# Patient Record
Sex: MALE | Race: WHITE | NOT HISPANIC OR LATINO | Employment: FULL TIME | URBAN - METROPOLITAN AREA
[De-identification: names, ages, dates, MRNs, and addresses within clinical notes are randomized per-mention and may not be internally consistent; named-entity substitution may affect disease eponyms.]

---

## 2020-05-02 ENCOUNTER — HOSPITAL ENCOUNTER (EMERGENCY)
Facility: CLINIC | Age: 40
Discharge: HOME | End: 2020-05-02
Attending: EMERGENCY MEDICINE | Admitting: EMERGENCY MEDICINE
Payer: COMMERCIAL

## 2020-05-02 VITALS
TEMPERATURE: 98.2 F | HEART RATE: 78 BPM | BODY MASS INDEX: 19.2 KG/M2 | RESPIRATION RATE: 16 BRPM | WEIGHT: 130 LBS | OXYGEN SATURATION: 99 % | DIASTOLIC BLOOD PRESSURE: 68 MMHG | SYSTOLIC BLOOD PRESSURE: 115 MMHG

## 2020-05-02 DIAGNOSIS — J36 PERITONSILLAR ABSCESS: ICD-10-CM

## 2020-05-02 DIAGNOSIS — R07.0 THROAT PAIN: ICD-10-CM

## 2020-05-02 LAB
DEPRECATED S PYO AG THROAT QL EIA: POSITIVE
SPECIMEN SOURCE: ABNORMAL

## 2020-05-02 PROCEDURE — 87077 CULTURE AEROBIC IDENTIFY: CPT | Performed by: EMERGENCY MEDICINE

## 2020-05-02 PROCEDURE — 42700 I&D ABSCESS PERITONSILLAR: CPT | Mod: Z6 | Performed by: EMERGENCY MEDICINE

## 2020-05-02 PROCEDURE — 42700 I&D ABSCESS PERITONSILLAR: CPT | Performed by: EMERGENCY MEDICINE

## 2020-05-02 PROCEDURE — 99283 EMERGENCY DEPT VISIT LOW MDM: CPT | Mod: 25 | Performed by: EMERGENCY MEDICINE

## 2020-05-02 PROCEDURE — 87880 STREP A ASSAY W/OPTIC: CPT | Performed by: EMERGENCY MEDICINE

## 2020-05-02 PROCEDURE — 87070 CULTURE OTHR SPECIMN AEROBIC: CPT | Performed by: EMERGENCY MEDICINE

## 2020-05-02 PROCEDURE — 25000125 ZZHC RX 250: Performed by: EMERGENCY MEDICINE

## 2020-05-02 RX ADMIN — BENZOCAINE 0.5 ML: 220 SPRAY, METERED PERIODONTAL at 05:17

## 2020-05-02 NOTE — ED AVS SNAPSHOT
[facility], [place], Emergency Department  [address]  [facility] [address]  Phone:  [phone]  Fax:  [phone]                                    Damon Forman   MRN: [id number]    Department:  [facility], Emergency Department   Date of Visit:  5/2/2020           After Visit Summary Signature Page    I have received my discharge instructions, and my questions have been answered. I have discussed any challenges I see with this plan with the nurse or doctor.    ..........................................................................................................................................  Patient/Patient Representative Signature      ..........................................................................................................................................  Patient Representative Print Name and Relationship to Patient    ..................................................               ................................................  Date                                   Time    ..........................................................................................................................................  Reviewed by Signature/Title    ...................................................              ..............................................  Date                                               Time          22EPIC Rev 08/18        Cigarettes

## 2020-05-02 NOTE — DISCHARGE INSTRUCTIONS
Take the prescribed antibiotic (Augmentin). Use 600 mg ibuprofen and/or 650 mg acetaminophen every 6 hours as needed for pain.     Please make an appointment to follow up with [facility] (phone: [phone]) in 3-6 days.     Return to the ED if you are having worsening symptoms, or any urgent/life-threatening concerns.

## 2020-05-02 NOTE — ED PROVIDER NOTES
History     Chief Complaint   Patient presents with     Pharyngitis     HPI  Damon Forman is a 39 year old male who presents to the ED with throat pain. Patient notes symptoms past 3-4 days, gradually worsening. He notes more pain and some swelling on the left side. He had a peritonsillar abscess in the past which felt similar but more severe than this. Not coughing, no fever.      I have reviewed the Medications, Allergies, Past Medical and Surgical History, and Social History in the Epic system.    Review of Systems  No recent fevers, no chest pain, no abdominal pain    Physical Exam   BP: 127/89  Pulse: 93  Temp: 98.6  F (37  C)  Resp: 16  Weight: 59 kg (130 lb)  SpO2: 99 %    Physical Exam  General: No acute distress. Appears stated age.   HENT: MMM, uvula deviated to the right with left peritonsillar significant swelling present. Trace purulent tonsillar exudate  Neck: cervical lymphadenopathy present, left > right  Eyes: PERRL, normal sclerae   Cardio: Regular rate, extremities well perfused  Resp: Normal work of breathing, normal respiratory rate  Neuro: alert and fully oriented. CN II-XII grossly intact. Grossly normal strength and sensation in all extremities.   MSK: no deformities.   Integumentary/Skin: no rash, normal color  Psych: normal affect, normal behavior    ED Course      [facility], [place]    PROCEDURE: -Incision/Drainage    Date/Time: 5/2/2020 4:54 AM  Performed by: Saul Duran MD  Authorized by: Saul Duran MD       LOCATION:      Type:  Abscess    Location:  Mouth    Mouth location:  Peritonsillar    ANESTHESIA (see MAR for exact dosages):     Anesthesia method:  Topical application    Topical anesthetic:  Benzocaine gel    PROCEDURE TYPE:     Complexity:  Simple    PROCEDURE DETAILS:     Needle aspiration: yes      Needle size:  18 G    Incision types:  Stab incision    Incision depth:  Subcutaneous    Scalpel blade:  11    Drainage:   Purulent    Drainage amount:  Moderate (2 ml aspirated)    Packing materials:  None  Post-procedure details:     PROCEDURE   Patient Tolerance:  Patient tolerated the procedure well with no immediate complications              Critical Care time:  none     Labs Ordered and Resulted from Time of ED Arrival Up to the Time of Departure from the ED   ABSCESS CULTURE AEROBIC BACTERIAL     No orders to display          Assessments & Plan (with Medical Decision Making)   Patient presenting with throat pain, primarily on the left side. Vitals in the ED unremarkable. Nursing notes reviewed.     Exam consistent with left peritonsillar abscess. Topical anesthetic applied and abscess aspirated and incised as detailed above without complication. Abscess aspirate sent for culture. Rapid strep positive.     The complete clinical picture is most consistent with peritonsillar abscess. After counseling on the diagnosis, work-up, and treatment plan, the patient was discharged. The patient was advised to follow-up with ENT in a few days. Augmentin prescribed. The patient was advised to return to the ED if worsening symptoms, or if there are any urgent/life-threatening concerns.     Final diagnoses:   Peritonsillar abscess   Throat pain     Discharge Medication List as of 5/2/2020  5:39 AM          --  Saul Duran MD   Emergency Medicine   [facility] EMERGENCY DEPARTMENT  5/2/2020     Saul Duran MD  05/02/20 0711

## 2020-05-04 LAB
BACTERIA SPEC CULT: ABNORMAL
BACTERIA SPEC CULT: ABNORMAL
SPECIMEN SOURCE: ABNORMAL

## 2020-10-11 ENCOUNTER — APPOINTMENT (OUTPATIENT)
Dept: GENERAL RADIOLOGY | Facility: CLINIC | Age: 40
End: 2020-10-11
Attending: EMERGENCY MEDICINE
Payer: COMMERCIAL

## 2020-10-11 ENCOUNTER — APPOINTMENT (OUTPATIENT)
Dept: CT IMAGING | Facility: CLINIC | Age: 40
End: 2020-10-11
Attending: EMERGENCY MEDICINE
Payer: COMMERCIAL

## 2020-10-11 ENCOUNTER — HOSPITAL ENCOUNTER (EMERGENCY)
Facility: CLINIC | Age: 40
Discharge: HOME | End: 2020-10-11
Attending: EMERGENCY MEDICINE | Admitting: EMERGENCY MEDICINE
Payer: COMMERCIAL

## 2020-10-11 VITALS
OXYGEN SATURATION: 99 % | SYSTOLIC BLOOD PRESSURE: 118 MMHG | RESPIRATION RATE: 16 BRPM | HEART RATE: 82 BPM | DIASTOLIC BLOOD PRESSURE: 84 MMHG | TEMPERATURE: 98.8 F

## 2020-10-11 DIAGNOSIS — S42.102A CLOSED NONDISPLACED FRACTURE OF LEFT SCAPULA: ICD-10-CM

## 2020-10-11 DIAGNOSIS — S61.412A STAB WOUND OF LEFT HAND, INITIAL ENCOUNTER: ICD-10-CM

## 2020-10-11 DIAGNOSIS — Z23 NEED FOR PROPHYLACTIC VACCINATION AND INOCULATION AGAINST CHOLERA ALONE: ICD-10-CM

## 2020-10-11 DIAGNOSIS — S41.012A: ICD-10-CM

## 2020-10-11 LAB
ANION GAP SERPL CALCULATED.3IONS-SCNC: 2 MMOL/L (ref 3–14)
BASOPHILS # BLD AUTO: 0.1 10E9/L (ref 0–0.2)
BASOPHILS NFR BLD AUTO: 0.5 %
BUN SERPL-MCNC: 17 MG/DL (ref 7–30)
CALCIUM SERPL-MCNC: 8.4 MG/DL (ref 8.5–10.1)
CHLORIDE SERPL-SCNC: 106 MMOL/L (ref 94–109)
CO2 SERPL-SCNC: 31 MMOL/L (ref 20–32)
CREAT SERPL-MCNC: 0.93 MG/DL (ref 0.66–1.25)
DIFFERENTIAL METHOD BLD: NORMAL
EOSINOPHIL # BLD AUTO: 0.2 10E9/L (ref 0–0.7)
EOSINOPHIL NFR BLD AUTO: 2.3 %
ERYTHROCYTE [DISTWIDTH] IN BLOOD BY AUTOMATED COUNT: 12.6 % (ref 10–15)
GFR SERPL CREATININE-BSD FRML MDRD: >90 ML/MIN/{1.73_M2}
GLUCOSE SERPL-MCNC: 94 MG/DL (ref 70–99)
HCT VFR BLD AUTO: 43.7 % (ref 40–53)
HGB BLD-MCNC: 14.5 G/DL (ref 13.3–17.7)
IMM GRANULOCYTES # BLD: 0.1 10E9/L (ref 0–0.4)
IMM GRANULOCYTES NFR BLD: 0.5 %
INR PPP: 1.03 (ref 0.86–1.14)
LYMPHOCYTES # BLD AUTO: 0.8 10E9/L (ref 0.8–5.3)
LYMPHOCYTES NFR BLD AUTO: 7.9 %
MCH RBC QN AUTO: 31.1 PG (ref 26.5–33)
MCHC RBC AUTO-ENTMCNC: 33.2 G/DL (ref 31.5–36.5)
MCV RBC AUTO: 94 FL (ref 78–100)
MONOCYTES # BLD AUTO: 0.7 10E9/L (ref 0–1.3)
MONOCYTES NFR BLD AUTO: 6.8 %
NEUTROPHILS # BLD AUTO: 8.3 10E9/L (ref 1.6–8.3)
NEUTROPHILS NFR BLD AUTO: 82 %
NRBC # BLD AUTO: 0 10*3/UL
NRBC BLD AUTO-RTO: 0 /100
PLATELET # BLD AUTO: 253 10E9/L (ref 150–450)
POTASSIUM SERPL-SCNC: 4.6 MMOL/L (ref 3.4–5.3)
RBC # BLD AUTO: 4.66 10E12/L (ref 4.4–5.9)
SODIUM SERPL-SCNC: 139 MMOL/L (ref 133–144)
WBC # BLD AUTO: 10.1 10E9/L (ref 4–11)

## 2020-10-11 PROCEDURE — 80048 BASIC METABOLIC PNL TOTAL CA: CPT | Performed by: EMERGENCY MEDICINE

## 2020-10-11 PROCEDURE — 90714 TD VACC NO PRESV 7 YRS+ IM: CPT | Performed by: EMERGENCY MEDICINE

## 2020-10-11 PROCEDURE — 71260 CT THORAX DX C+: CPT

## 2020-10-11 PROCEDURE — 73130 X-RAY EXAM OF HAND: CPT | Mod: LT

## 2020-10-11 PROCEDURE — 250N000013 HC RX MED GY IP 250 OP 250 PS 637: Performed by: EMERGENCY MEDICINE

## 2020-10-11 PROCEDURE — 93005 ELECTROCARDIOGRAM TRACING: CPT

## 2020-10-11 PROCEDURE — 96366 THER/PROPH/DIAG IV INF ADDON: CPT

## 2020-10-11 PROCEDURE — 682N000003 HC TRAUMA EVALUATION W/O CC LEVEL II

## 2020-10-11 PROCEDURE — 250N000009 HC RX 250: Performed by: EMERGENCY MEDICINE

## 2020-10-11 PROCEDURE — 93010 ELECTROCARDIOGRAM REPORT: CPT | Performed by: EMERGENCY MEDICINE

## 2020-10-11 PROCEDURE — 96365 THER/PROPH/DIAG IV INF INIT: CPT

## 2020-10-11 PROCEDURE — 250N000011 HC RX IP 250 OP 636: Performed by: EMERGENCY MEDICINE

## 2020-10-11 PROCEDURE — 99285 EMERGENCY DEPT VISIT HI MDM: CPT | Mod: 25

## 2020-10-11 PROCEDURE — 90471 IMMUNIZATION ADMIN: CPT | Performed by: EMERGENCY MEDICINE

## 2020-10-11 PROCEDURE — 71046 X-RAY EXAM CHEST 2 VIEWS: CPT

## 2020-10-11 PROCEDURE — 85025 COMPLETE CBC W/AUTO DIFF WBC: CPT | Performed by: EMERGENCY MEDICINE

## 2020-10-11 PROCEDURE — 85610 PROTHROMBIN TIME: CPT | Performed by: EMERGENCY MEDICINE

## 2020-10-11 PROCEDURE — 99285 EMERGENCY DEPT VISIT HI MDM: CPT | Mod: 25 | Performed by: EMERGENCY MEDICINE

## 2020-10-11 RX ORDER — CEFAZOLIN SODIUM 1 G/3ML
1 INJECTION, POWDER, FOR SOLUTION INTRAMUSCULAR; INTRAVENOUS ONCE
Status: COMPLETED | OUTPATIENT
Start: 2020-10-11 | End: 2020-10-11

## 2020-10-11 RX ORDER — IOPAMIDOL 755 MG/ML
100 INJECTION, SOLUTION INTRAVASCULAR ONCE
Status: COMPLETED | OUTPATIENT
Start: 2020-10-11 | End: 2020-10-11

## 2020-10-11 RX ORDER — HYDROCODONE BITARTRATE AND ACETAMINOPHEN 5; 325 MG/1; MG/1
1 TABLET ORAL ONCE
Status: COMPLETED | OUTPATIENT
Start: 2020-10-11 | End: 2020-10-11

## 2020-10-11 RX ORDER — IBUPROFEN 600 MG/1
600 TABLET, FILM COATED ORAL ONCE
Status: COMPLETED | OUTPATIENT
Start: 2020-10-11 | End: 2020-10-11

## 2020-10-11 RX ORDER — IBUPROFEN 600 MG/1
600 TABLET, FILM COATED ORAL EVERY 6 HOURS PRN
Qty: 20 TABLET | Refills: 0 | Status: SHIPPED | OUTPATIENT
Start: 2020-10-11 | End: 2021-09-04

## 2020-10-11 RX ADMIN — CEFAZOLIN 1 G: 1 INJECTION, POWDER, FOR SOLUTION INTRAMUSCULAR; INTRAVENOUS at 12:00

## 2020-10-11 RX ADMIN — CLOSTRIDIUM TETANI TOXOID ANTIGEN (FORMALDEHYDE INACTIVATED) AND CORYNEBACTERIUM DIPHTHERIAE TOXOID ANTIGEN (FORMALDEHYDE INACTIVATED) 0.5 ML: 5; 2 INJECTION, SUSPENSION INTRAMUSCULAR at 10:50

## 2020-10-11 RX ADMIN — HYDROCODONE BITARTRATE AND ACETAMINOPHEN 1 TABLET: 5; 325 TABLET ORAL at 10:47

## 2020-10-11 RX ADMIN — IBUPROFEN 600 MG: 600 TABLET, FILM COATED ORAL at 10:47

## 2020-10-11 RX ADMIN — SODIUM CHLORIDE 55 ML: 9 INJECTION, SOLUTION INTRAVENOUS at 12:50

## 2020-10-11 RX ADMIN — IOPAMIDOL 80 ML: 755 INJECTION, SOLUTION INTRAVENOUS at 12:49

## 2020-10-11 ASSESSMENT — ENCOUNTER SYMPTOMS
ABDOMINAL PAIN: 0
ARTHRALGIAS: 0
HEADACHES: 0
DIFFICULTY URINATING: 0
COLOR CHANGE: 0
NECK STIFFNESS: 0
WOUND: 1
CONFUSION: 0
SHORTNESS OF BREATH: 0
FEVER: 0
EYE REDNESS: 0

## 2020-10-11 NOTE — ED AVS SNAPSHOT
[facility] Emergency Department  [address]  [place] [facility] [place] [facility] [place] [address]  Phone: [phone]  Fax: [phone]                                    Damon Forman   MRN: [id number]    Department: [facility] Emergency Department   Date of Visit: 10/11/2020           After Visit Summary Signature Page    I have received my discharge instructions, and my questions have been answered. I have discussed any challenges I see with this plan with the nurse or doctor.    ..........................................................................................................................................  Patient/Patient Representative Signature      ..........................................................................................................................................  Patient Representative Print Name and Relationship to Patient    ..................................................               ................................................  Date                                   Time    ..........................................................................................................................................  Reviewed by Signature/Title    ...................................................              ..............................................  Date                                               Time          22EPIC Rev 08/18
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
## 2020-10-12 LAB — INTERPRETATION ECG - MUSE: NORMAL

## 2021-09-04 ENCOUNTER — APPOINTMENT (OUTPATIENT)
Dept: GENERAL RADIOLOGY | Facility: CLINIC | Age: 41
End: 2021-09-04
Attending: FAMILY MEDICINE
Payer: COMMERCIAL

## 2021-09-04 ENCOUNTER — HOSPITAL ENCOUNTER (EMERGENCY)
Facility: CLINIC | Age: 41
Discharge: HOME | End: 2021-09-04
Attending: FAMILY MEDICINE | Admitting: FAMILY MEDICINE
Payer: COMMERCIAL

## 2021-09-04 VITALS
OXYGEN SATURATION: 100 % | SYSTOLIC BLOOD PRESSURE: 126 MMHG | HEART RATE: 83 BPM | HEIGHT: 69 IN | BODY MASS INDEX: 21.11 KG/M2 | WEIGHT: 142.5 LBS | DIASTOLIC BLOOD PRESSURE: 92 MMHG | TEMPERATURE: 98.2 F | RESPIRATION RATE: 16 BRPM

## 2021-09-04 DIAGNOSIS — T25.229A PARTIAL THICKNESS BURN OF FOOT, UNSPECIFIED LATERALITY, INITIAL ENCOUNTER: ICD-10-CM

## 2021-09-04 DIAGNOSIS — S90.425A: ICD-10-CM

## 2021-09-04 DIAGNOSIS — L08.9: ICD-10-CM

## 2021-09-04 PROCEDURE — 99283 EMERGENCY DEPT VISIT LOW MDM: CPT | Performed by: FAMILY MEDICINE

## 2021-09-04 PROCEDURE — 99284 EMERGENCY DEPT VISIT MOD MDM: CPT | Performed by: FAMILY MEDICINE

## 2021-09-04 PROCEDURE — 250N000013 HC RX MED GY IP 250 OP 250 PS 637: Performed by: FAMILY MEDICINE

## 2021-09-04 PROCEDURE — 73630 X-RAY EXAM OF FOOT: CPT | Mod: LT

## 2021-09-04 RX ADMIN — AMOXICILLIN AND CLAVULANATE POTASSIUM 1 TABLET: 875; 125 TABLET, FILM COATED ORAL at 15:57

## 2021-09-04 ASSESSMENT — ENCOUNTER SYMPTOMS
WOUND: 1
NECK STIFFNESS: 0
COLOR CHANGE: 0
DIFFICULTY URINATING: 0
NAUSEA: 0
DECREASED CONCENTRATION: 0
BRUISES/BLEEDS EASILY: 0
SHORTNESS OF BREATH: 0
CONFUSION: 0
APPETITE CHANGE: 0
ABDOMINAL PAIN: 0
DYSPHORIC MOOD: 0
ARTHRALGIAS: 0
ACTIVITY CHANGE: 1
HEADACHES: 0
EYE REDNESS: 0
FEVER: 0

## 2021-09-04 ASSESSMENT — MIFFLIN-ST. JEOR: SCORE: 1551.76

## 2021-09-04 NOTE — DISCHARGE INSTRUCTIONS
Home.  Adjust footwear.  Take the augmentin twice a day for infection.  Apply bacitracin daily to open blisters also.  Elevate leg as able.  Tylenol for pain.  See MD if any concerns at all.  REturn if any concerns.  Xray was negative.    Please make an appointment to follow up with Your Primary Care Provider[facility] (phone: [phone]), Primary Care - [facility] (phone: [phone]), Primary Care - [facility] (phone: [phone]), and Primary Care - [facility] (phone: [phone]) as soon as possible.As needed.

## 2021-09-04 NOTE — ED PROVIDER NOTES
ED Provider Note  [facility]      History     Chief Complaint   Patient presents with     Foot Pain     Pt states he  has blisters on both feet but his left 5th toe is open wound.  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  Started to get worse for a few days.     HPI  Damon Forman is a 40 year old male who presents to the Emergency Department for foot pain.  Has blisters on both feet but the fifth digit (small) on his left toe has an open wound.  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  The wound has been worsening over the last few days.  Said his shoes may have had some holes in them and were not necessarily enough to fit his feet.  Denies history of MRSA, diabetes or history of immunosuppression.  States that he had a little bit of pain but mostly wanted to get checked out due to how it looked.  Denies any allergies.    Past Medical History  History reviewed. No pertinent past medical history.  History reviewed. No pertinent surgical history.  amoxicillin-clavulanate (AUGMENTIN) 875-125 MG tablet      No Known Allergies  Family History  No family history on file.  Social History   Social History     Tobacco Use     Smoking status: Never Smoker     Smokeless tobacco: Never Used   Substance Use Topics     Alcohol use: Yes     Comment: occ     Drug use: No      Past medical history, past surgical history, medications, allergies, family history, and social history were reviewed with the patient. No additional pertinent items.       Review of Systems   Constitutional: Positive for activity change. Negative for appetite change and fever.   HENT: Negative for congestion.    Eyes: Negative for redness.   Respiratory: Negative for shortness of breath.    Cardiovascular: Negative for chest pain.   Gastrointestinal: Negative for abdominal pain and nausea.  "  Genitourinary: Negative for difficulty urinating.   Musculoskeletal: Positive for gait problem. Negative for arthralgias and neck stiffness.   Skin: Positive for wound (L 5th toe wound). Negative for color change.   Allergic/Immunologic: Negative for immunocompromised state.   Neurological: Negative for headaches.   Hematological: Does not bruise/bleed easily.   Psychiatric/Behavioral: Negative for confusion, decreased concentration and dysphoric mood.   All other systems reviewed and are negative.    A complete review of systems was performed with pertinent positives and negatives noted in the HPI, and all other systems negative.    Physical Exam   BP: (!) 144/88  Pulse: 82  Temp: 98.2  F (36.8  C)  Resp: 16  Height: 175.3 cm (5' 9\")  Weight: 64.6 kg (142 lb 8 oz)  SpO2: 99 %  Physical Exam  Vitals and nursing note reviewed.   Constitutional:       General: He is in acute distress.      Appearance: He is well-developed. He is not toxic-appearing or diaphoretic.      Comments: Patient nontoxic.   HENT:      Head: Normocephalic and atraumatic.      Nose: Nose normal.      Mouth/Throat:      Mouth: Mucous membranes are moist.   Eyes:      General: No scleral icterus.     Pupils: Pupils are equal, round, and reactive to light.   Cardiovascular:      Rate and Rhythm: Normal rate.   Pulmonary:      Effort: Pulmonary effort is normal.   Abdominal:      General: There is no distension.      Tenderness: There is no guarding.   Musculoskeletal:      Cervical back: Normal range of motion and neck supple.   Skin:     General: Skin is warm.      Capillary Refill: Capillary refill takes less than 2 seconds.      Findings: Erythema and rash present.      Comments: Patient with some blistering of both toes plantar aspect.  Ruptured blister of the medial portion of the left fifth toe with hyperemia of the toe itself no lymphangitic spread otherwise.  No crepitus.   Neurological:      General: No focal deficit present.      Mental " Status: He is alert and oriented to person, place, and time. Mental status is at baseline.   Psychiatric:      Comments: Appropriate here         ED Course     1:25 PM  The patient was seen and examined by Srini Christopher MD in Room ED02.  \      Patient valuated here in the ER.  X-rays are done of the foot there is no bony deformity no subcu air etc.  Discussed with patient regarding this the wound was cleaned and soaked here in the ER bacitracin dressing applied.  Patient received an oral dose of Augmentin 875 mg discussed as far as treatment patient discharged with 10 days of Augmentin along with bacitracin cleaning the wound daily following up with MD return if any concerns.  Patient agrees with plan comfortably discharged mother present also.      Procedures              Results for orders placed or performed during the hospital encounter of 09/04/21   Foot XR, G/E 3 views, left     Status: None    Narrative    FOOT LEFT THREE OR MORE VIEWS  DATE/TIME: 9/4/2021 1:52 PM     INDICATION: Fifth toe infection and pain.  COMPARISON: None.      Impression    IMPRESSION: Normal joint spacing and alignment. No fracture. No soft  tissue gas or radiodense foreign body.    AMY WRIGHT MD         SYSTEM ID:  [id number]     Medications   amoxicillin-clavulanate (AUGMENTIN) 875-125 MG per tablet 1 tablet (1 tablet Oral Given 9/4/21 1557)        Assessments & Plan (with Medical Decision Making)  40-year-old male without immunosuppression or diabetes presents after poor footwear which she spends a lot of time on his feet developed blisters on both feet now the left fifth toe is hyperemic swollen but x-rays are negative for any concerning findings more likely localized cellulitis.  Patient does not have history of MRSA etc.  No sign of fungal infection.  Patient treated with Augmentin given initial dose in the ER wound was cleaned and bacitracin applied patient notes he has better footwear at this point will be discharged  follow-up with MD recommendations with the Augmentin for 10 days elevating legs modifying foot orthotics etc.  Return if any concerns       I have reviewed the nursing notes. I have reviewed the findings, diagnosis, plan and need for follow up with the patient.    Discharge Medication List as of 9/4/2021  4:08 PM      START taking these medications    Details   amoxicillin-clavulanate (AUGMENTIN) 875-125 MG tablet Take 1 tablet by mouth 2 times daily for 10 days, Disp-20 tablet, R-0, Local Print             Final diagnoses:   Infected blister of fifth toe of left foot, initial encounter   Partial thickness burn of foot, unspecified laterality, initial encounter     IMartin, am serving as a trained medical scribe to document services personally performed by Srini Christopher MD, based on the provider's statements to me.   Candi LIZARRAGA MD, was physically present and have reviewed and verified the accuracy of this note documented by Martin Arambula.     --  Srini Christopher    [facility] EMERGENCY DEPARTMENT  9/4/2021    This note was created at least in part by the use of dragon voice dictation system. Inadvertent typographical errors may still exist.  Srini Christopher MD.    Patient evaluated in the emergency department during the COVID-19 pandemic period. Careful attention to patients safety was addressed throughout the evaluation. Evaluation and treatment management was initiated with disposition made efficiently and appropriate as possible to minimize any risk of potential exposure to patient during this evaluation.         Srini Christopher MD  09/04/21 7922

## 2022-06-23 ENCOUNTER — APPOINTMENT (OUTPATIENT)
Dept: GENERAL RADIOLOGY | Facility: CLINIC | Age: 42
End: 2022-06-23
Attending: FAMILY MEDICINE
Payer: COMMERCIAL

## 2022-06-23 ENCOUNTER — HOSPITAL ENCOUNTER (EMERGENCY)
Facility: CLINIC | Age: 42
Discharge: HOME | End: 2022-06-23
Attending: FAMILY MEDICINE | Admitting: FAMILY MEDICINE
Payer: COMMERCIAL

## 2022-06-23 VITALS
RESPIRATION RATE: 18 BRPM | SYSTOLIC BLOOD PRESSURE: 118 MMHG | TEMPERATURE: 98.3 F | DIASTOLIC BLOOD PRESSURE: 82 MMHG | HEART RATE: 78 BPM | OXYGEN SATURATION: 99 %

## 2022-06-23 DIAGNOSIS — S62.336A CLOSED DISPLACED FRACTURE OF NECK OF FIFTH METACARPAL BONE OF RIGHT HAND, INITIAL ENCOUNTER: ICD-10-CM

## 2022-06-23 PROCEDURE — 73130 X-RAY EXAM OF HAND: CPT | Mod: RT

## 2022-06-23 PROCEDURE — 99284 EMERGENCY DEPT VISIT MOD MDM: CPT | Performed by: FAMILY MEDICINE

## 2022-06-23 PROCEDURE — 250N000011 HC RX IP 250 OP 636: Performed by: FAMILY MEDICINE

## 2022-06-23 PROCEDURE — 999N000065 XR HAND RT G/E 3 VW

## 2022-06-23 PROCEDURE — 250N000013 HC RX MED GY IP 250 OP 250 PS 637: Performed by: FAMILY MEDICINE

## 2022-06-23 PROCEDURE — 99283 EMERGENCY DEPT VISIT LOW MDM: CPT | Mod: 57

## 2022-06-23 PROCEDURE — 96372 THER/PROPH/DIAG INJ SC/IM: CPT | Performed by: FAMILY MEDICINE

## 2022-06-23 PROCEDURE — 26605 TREAT METACARPAL FRACTURE: CPT | Mod: F9 | Performed by: FAMILY MEDICINE

## 2022-06-23 PROCEDURE — 26605 TREAT METACARPAL FRACTURE: CPT | Mod: RT

## 2022-06-23 PROCEDURE — 99284 EMERGENCY DEPT VISIT MOD MDM: CPT | Mod: 25 | Performed by: FAMILY MEDICINE

## 2022-06-23 RX ORDER — LIDOCAINE HYDROCHLORIDE 20 MG/ML
40 INJECTION, SOLUTION INFILTRATION; PERINEURAL ONCE
Status: DISCONTINUED | OUTPATIENT
Start: 2022-06-23 | End: 2022-06-23 | Stop reason: HOSPADM

## 2022-06-23 RX ORDER — OXYCODONE HYDROCHLORIDE 5 MG/1
10 TABLET ORAL ONCE
Status: COMPLETED | OUTPATIENT
Start: 2022-06-23 | End: 2022-06-23

## 2022-06-23 RX ORDER — OXYCODONE HYDROCHLORIDE 5 MG/1
5 TABLET ORAL EVERY 6 HOURS PRN
Qty: 12 TABLET | Refills: 0 | Status: SHIPPED | OUTPATIENT
Start: 2022-06-23 | End: 2022-06-26

## 2022-06-23 RX ORDER — KETOROLAC TROMETHAMINE 30 MG/ML
60 INJECTION, SOLUTION INTRAMUSCULAR; INTRAVENOUS ONCE
Status: COMPLETED | OUTPATIENT
Start: 2022-06-23 | End: 2022-06-23

## 2022-06-23 RX ORDER — LIDOCAINE HYDROCHLORIDE 10 MG/ML
INJECTION, SOLUTION EPIDURAL; INFILTRATION; INTRACAUDAL; PERINEURAL
Status: DISCONTINUED
Start: 2022-06-23 | End: 2022-06-23 | Stop reason: HOSPADM

## 2022-06-23 RX ORDER — KETOROLAC TROMETHAMINE 30 MG/ML
60 INJECTION, SOLUTION INTRAMUSCULAR; INTRAVENOUS ONCE
Status: DISCONTINUED | OUTPATIENT
Start: 2022-06-23 | End: 2022-06-23

## 2022-06-23 RX ORDER — IBUPROFEN 200 MG
600 TABLET ORAL EVERY 6 HOURS PRN
Qty: 30 TABLET | Refills: 0 | Status: SHIPPED | OUTPATIENT
Start: 2022-06-23

## 2022-06-23 RX ADMIN — KETOROLAC TROMETHAMINE 60 MG: 30 INJECTION, SOLUTION INTRAMUSCULAR at 12:11

## 2022-06-23 RX ADMIN — OXYCODONE HYDROCHLORIDE 10 MG: 5 TABLET ORAL at 10:36

## 2022-06-23 NOTE — DISCHARGE INSTRUCTIONS
Orthopedic surgery clinic will call you to schedule appointment to follow up with Orthopedics Clinic (phone: [phone]) .  Call them if you do not hear from them within the next 2 business days.  Continue to wear your splint, elevate your hand is much as possible.  Use oxycodone or ibuprofen for pain.

## 2022-06-23 NOTE — ED TRIAGE NOTES
Pt fell in bathroom last night. Believes they dislocated their fifth finger on their right hand. Numbness/tingling present in the fourth and fifth digit. Finger is warm to touch. Cap refill present. Pulse present.      Triage Assessment     Row Name 06/23/22 0826       Triage Assessment (Adult)    Airway WDL WDL       Respiratory WDL    Respiratory WDL WDL       Skin Circulation/Temperature WDL    Skin Circulation/Temperature WDL X;circulation    Skin Circulation pallor       Peripheral/Neurovascular WDL    Peripheral Neurovascular WDL X;neurovascular assessment upper       RUE Neurovascular Assessment    Temperature RUE warm    Sensation RUE numbness present;tenderness present;tingling present       Cognitive/Neuro/Behavioral WDL    Cognitive/Neuro/Behavioral WDL WDL    Row Name 06/23/22 0824       Triage Assessment (Adult)    Airway WDL WDL       Respiratory WDL    Respiratory WDL WDL       Skin Circulation/Temperature WDL    Skin Circulation/Temperature WDL WDL       Cardiac WDL    Cardiac WDL WDL       Peripheral/Neurovascular WDL    Peripheral Neurovascular WDL WDL       Cognitive/Neuro/Behavioral WDL    Cognitive/Neuro/Behavioral WDL WDL

## 2022-06-23 NOTE — ED PROVIDER NOTES
ED Provider Note  [facility]      History     Chief Complaint   Patient presents with     Hand Injury     Pt fell in bathroom last night     HPI  Damon Forman is a 41 year old male who presents due to a right hand injury.  He states he had a mechanical fall in the bathroom last night and landed on the floor with his right hand in a closed fist position.  Pain and deformity at the right fifth MCP joint.  Waited till this morning to come in but does not give explanation.  Denies other injuries or complaints.    Past Medical History  History reviewed. No pertinent past medical history.  History reviewed. No pertinent surgical history.  ibuprofen (ADVIL/MOTRIN) 200 MG tablet  oxyCODONE (ROXICODONE) 5 MG tablet      No Known Allergies  Family History  History reviewed. No pertinent family history.  Social History   Social History     Tobacco Use     Smoking status: Never Smoker     Smokeless tobacco: Never Used   Substance Use Topics     Alcohol use: Yes     Comment: occ     Drug use: No      Past medical history, past surgical history, medications, allergies, family history, and social history were reviewed with the patient. No additional pertinent items.       Review of Systems  A complete review of systems was performed with pertinent positives and negatives noted in the HPI, and all other systems negative.    Physical Exam   BP: 118/82  Pulse: 78  Temp: 98.3  F (36.8  C)  Resp: 18  SpO2: 99 %  Physical Exam  Vitals and nursing note reviewed.   Constitutional:       General: He is not in acute distress.     Appearance: He is not diaphoretic.   HENT:      Head: Atraumatic.   Eyes:      General: No scleral icterus.     Pupils: Pupils are equal, round, and reactive to light.   Cardiovascular:      Heart sounds: Normal heart sounds.   Pulmonary:      Effort: No respiratory distress.      Breath sounds: Normal breath sounds.   Abdominal:      General: Bowel sounds are normal.       Palpations: Abdomen is soft.      Tenderness: There is no abdominal tenderness.   Musculoskeletal:         General: No tenderness.        Hands:    Skin:     General: Skin is warm.      Findings: No rash.         ED Course      Procedures       The medical record was reviewed and interpreted.  Current images reviewed and interpreted: Plain films of right hand with displaced fifth metacarpal neck fracture.  Managed outpatient prescription medications.              Results for orders placed or performed during the hospital encounter of 06/23/22   XR Hand Right G/E 3 Views     Status: None    Narrative    EXAM: HAND RIGHT THREE OR MORE VIEWS   DATE/TIME: 6/23/2022 9:01 AM     INDICATION: Right-sided hand pain after trauma.   COMPARISON: None.      Impression    IMPRESSION:  1.  Transverse fracture of the right fifth metacarpal neck. Mild  palmar displacement, mild impaction, and moderate dorsal apex  angulation.  2.  Normal joint spacing and alignment.  3.  Soft tissue swelling adjacent to the fracture.    AMY WRIGHT MD         SYSTEM ID:  [id number]   XR Hand Right G/E 3 Views     Status: None    Narrative    Examination:  XR HAND RT G/E 3 VW    Date:  6/23/2022 12:08 PM     Clinical Information: Post reduction and splinting evaluation.    Comparison: 6/23/2022 at 0900 hours.      Impression    Impression:    1.  Improved alignment of the boxer's fracture of the fifth metacarpal  distal metaphysis after closed reduction. No new fracture. No joint  subluxation or dislocation. Persistent soft tissue swelling in the  ulnar aspect of the hand. New splint in place.    MARA URENA MD         SYSTEM ID:  [id number]     Medications   lidocaine injection 2% (MDV) (has no administration in time range)   lidocaine (PF) (XYLOCAINE) 1 % injection (has no administration in time range)   oxyCODONE (ROXICODONE) tablet 10 mg (10 mg Oral Given 6/23/22 1036)   ketorolac (TORADOL) injection 60 mg (60 mg Intramuscular Given  6/23/22 1211)        Assessments & Plan (with Medical Decision Making)   A 41-year-old male who suffered a mechanical fall and a displaced fifth metacarpal neck fracture.  Neurovascular status of his hand intact.  This appears to be an isolated injury.  Orthopedics was consulted, saw the patient in the ED, reduced and splinted the hand.  He complained of some pain and paresthesia afterwards.  Was seen again by orthopedics who adjusted the splint and placed a nerve block.  He is now much more comfortable, his finger is well-perfused.  Patient will be discharged in the splint with hand clinic follow-up to be arranged by orthopedics.  He was given return precautions which we discussed at length.  Stable for discharge.    I have reviewed the nursing notes. I have reviewed the findings, diagnosis, plan and need for follow up with the patient.    New Prescriptions    IBUPROFEN (ADVIL/MOTRIN) 200 MG TABLET    Take 3 tablets (600 mg) by mouth every 6 hours as needed for mild pain    OXYCODONE (ROXICODONE) 5 MG TABLET    Take 1 tablet (5 mg) by mouth every 6 hours as needed for pain       Final diagnoses:   Closed displaced fracture of neck of fifth metacarpal bone of right hand, initial encounter       --  Elder Fishman MD  [facility] EMERGENCY DEPARTMENT  6/23/2022     Elder Fishman MD  06/23/22 0163

## 2022-06-23 NOTE — ED NOTES
Pt reporting worse pain to the right hand vs before ortho manipulation. Pt still has good color of the R 5th finger with less than 2 second capillary refill. Pt states he is not able to move his fingers. Splint in place.

## 2022-06-23 NOTE — CONSULTS
[facility] Orthopedic Consultation    Damon Forman MRN# [id number]   Age: 41 year old YOB: 1980   Date of Admission:  2022    Reason for consult: Right fifth metacarpal fracture    Requesting physician: Elder Fishman MD              Impression and Recommendation:     Impression:  Damon Forman is a 41 year old right-hand dominant male without significant PMH here with the followin. Transverse right fifth metacarpal fracture with palmar displacement     Recomendations:  Patient underwent reduction in the ED with subsequent ulnar gutter splint placement. He tolerated the procedure well. Instructed to keep splint clean, dry, and intact until follow up. Follow up next week with hand team for cast placement, scheduling team has been messaged and will reach out to the patient. We discussed return precautions including loss of sensation, persistent numbness/tingling, or discoloration of the fingers.       Assessment and Plan discussed with Dr. Barraza PGY 4 and Dr. Reynoso, Orthopedic Surgery attending.          Chief Complaint:   Right hand pain          History of Present Illness:   Damon Forman is a 41 year old right-hand dominant male without significant PMH here for evaluation of right hand pain. Patient tripped in the bathroom last night around 6 pm and caught himself on the bathtub with a closed fist. He had immediate pain and noticed deformity. He did hit his head but did not lose consciousness. Denies any other injuries.Was able to sleep through the night, presented to the ED this morning. Endorses 8/10 pain about the lateral aspect of the right hand. Denies numbness, tingling, weakness, or loss of sensation to the right hand. No wrist, forearm, elbow, or shoulder pain. Has never injured his RUE before.     History obtained from patient interview and chart review.        Past Medical History:   History reviewed. No pertinent past medical history.          Past Surgical  History:   History reviewed. No pertinent surgical history.          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           Allergies:   No Known Allergies          Medications:   Medication reviewed with patient and in chart.  Anticoagulation: None  Antibiotics: None          Review of Systems:   See HPI. 10 point review of systems is otherwise negative.           Physical Exam:     Vitals:    06/23/22 0826   BP: 118/82   BP Location: Left arm   Pulse: 78   Resp: 18   Temp: 98.3  F (36.8  C)   TempSrc: Oral   SpO2: 99%     General: Awake, alert, appropriate, following commands, NAD.  Neuro: EOM grossly intact  Skin: No rashes,  skin color normal.  HEENT: Normal.   Lungs: Breathing comfortably and nonlabored, no wheezes or stridor noted.  Heart/Cardiovascular: Regular pulse, no peripheral cyanosis.    Right Upper Extremity:   - Deformity about the fifth finger, skin intact   -TTP over the fifth metacarpal, diffuse TTP about the ulnar aspect of the hand   - No significant tenderness to palpation over sternoclavicular joint, clavicle, acromioclavicular joint, shoulder, arm, elbow, forearm, wrist.  - Hand ROM limited 2/2 to pain, no pain with ROM shoulder, elbow, wrist.  - Motor intact distally deltoid, FPL, EPL, IO with 5/5 strength.  - SILT median, ulnar, radial, axillary nerve distributions.  - Radial pulse palpable, fingers warm and well perfused.    Left Upper Extremity:   - No gross deformity, skin intact.  - No significant tenderness to palpation over sternoclavicular joint, clavicle, acromioclavicular joint, shoulder, arm, elbow, forearm, wrist, hand, fingers.  - No pain with ROM shoulder, elbow, wrist.  - Motor intact distally deltoid, FPL, EPL, IO with  5/5 strength.  - SILT median, ulnar, radial, axillary nerve distributions.  - Radial pulse palpable, fingers warm and well perfused.         Imaging:   All imaging independently reviewed.  XR R hand 6/23/22  IMPRESSION:  1.  Transverse fracture of the right fifth metacarpal neck. Mild  palmar displacement, mild impaction, and moderate dorsal apex  angulation.  2.  Normal joint spacing and alignment.  3.  Soft tissue swelling adjacent to the fracture.    Post reduction XR R hand 6/23/22  1.  Improved alignment of the boxer's fracture of the fifth metacarpal  distal metaphysis after closed reduction. No new fracture. No joint  subluxation or dislocation. Persistent soft tissue swelling in the  ulnar aspect of the hand. New splint in place          Laboratory date:   CBC:  Lab Results   Component Value Date    WBC 10.1 10/11/2020    HGB 14.5 10/11/2020     10/11/2020       BMP:  Lab Results   Component Value Date     10/11/2020    POTASSIUM 4.6 10/11/2020    CHLORIDE 106 10/11/2020    CO2 31 10/11/2020    BUN 17 10/11/2020    CR 0.93 10/11/2020    ANIONGAP 2 (L) 10/11/2020    SONNY 8.4 (L) 10/11/2020    GLC 94 10/11/2020       Inflammatory Markers:  Lab Results   Component Value Date    WBC 10.1 10/11/2020       Procedure:    DATE OF PROCEDURE: 6/23/22    PROVIDER: Maxime Barraza MD    ANESTHESIA: Local, 40 ml 2% lidocaine     INDICATION: Displaced fifth metacarpal fracture     POSTPROCEDURE DIAGNOSIS: Same    PROCEDURE(S) PERFORMED: Fifth metacarpal closed reduction, application of unit(s);pietro gutter splint    PROCEDURE:    After verbal informed consent was obtained, and the patient elected to proceed, a brief time out was held in accordance with hospital policy confirming the correct patient, procedure, site, and side.    The right hand was then prepped  in a sterile fashion using chlorhexidine scrub x2. The dorsal surface of the hand over the fifth metcarpal was then entered with a 22 gauge needle. The  joint was then infiltrated with a total of 40 cc of 2% lidocaine without epinephrine. The needle was withdrawn, and a sterile bandage was placed.     Once adequate analgesia had taken effect, the fifth metacarpal was then reduced into gross anatomic position with gentle and steady traction and manipulation of the fractured fragments. A well padded ulnar gutter splint was then applied. The patient tolerated the procedure well and no immediate complications were observed. CMS was tested and found to be intact following the procedure.     The patient was instructed to keep the splint clean and dry, and to keep it covered at all times while showering or bathing. The patient was instructed to return to clinic or the ED if the splint becomes saturated. The patient was advised to avoid sticking anything down or into the splint/cast. The patient can apply ice to the extremity, use a fan to blow air down the splint or cast, or try ibuprofen or tylenol to alleviate any itching.       Hayley Dove PA-C  6/23/2022 10:12 AM  Orthopaedic Surgery

## 2022-06-24 NOTE — TELEPHONE ENCOUNTER
DIAGNOSIS: 5th MTC fracture per staff message seen in ED 6/23/22 DOI 6/22/23   APPOINTMENT DATE: 06/28/2022   NOTES STATUS DETAILS   DISCHARGE REPORT from the ER Internal 06/23/2022 - [facility] ED     MEDICATION LIST Internal    LABS     CBC/DIFF Internal Most Recent: 10/11/2020   XRAYS (IMAGES & REPORTS) Internal 06/23/2022 (x2) - RT Hand

## 2022-06-27 DIAGNOSIS — S62.91XA HAND FRACTURE, RIGHT: Primary | ICD-10-CM

## 2022-06-28 ENCOUNTER — PRE VISIT (OUTPATIENT)
Dept: PLASTIC SURGERY | Facility: CLINIC | Age: 42
End: 2022-06-28

## 2022-06-28 ENCOUNTER — OFFICE VISIT (OUTPATIENT)
Dept: ORTHOPEDICS | Facility: CLINIC | Age: 42
End: 2022-06-28
Payer: COMMERCIAL

## 2022-06-28 DIAGNOSIS — S62.339A CLOSED BOXER'S FRACTURE, INITIAL ENCOUNTER: Primary | ICD-10-CM

## 2022-06-28 PROCEDURE — 99204 OFFICE O/P NEW MOD 45 MIN: CPT | Mod: 25 | Performed by: STUDENT IN AN ORGANIZED HEALTH CARE EDUCATION/TRAINING PROGRAM

## 2022-06-28 PROCEDURE — 29125 APPL SHORT ARM SPLINT STATIC: CPT | Mod: RT | Performed by: STUDENT IN AN ORGANIZED HEALTH CARE EDUCATION/TRAINING PROGRAM

## 2022-06-28 NOTE — NURSING NOTE
Reason For Visit:   Chief Complaint   Patient presents with     Consult     5th MTC Fracture Right hand DOI 6/23/22.  Fell in bath tub and hit hand       Primary MD: No Ref-Primary, Physician  Ref. MD: ED    Age: 41 year old    ?  No      There were no vitals taken for this visit.      Pain Assessment  Patient Currently in Pain: Yes  0-10 Pain Scale: 4 (with movement there is pain)  Primary Pain Location: Hand (right)    Hand Dominance Evaluation  Hand Dominance: Right          QuickDASH Assessment  No flowsheet data found.       Current Outpatient Medications   Medication Sig Dispense Refill     ibuprofen (ADVIL/MOTRIN) 200 MG tablet Take 3 tablets (600 mg) by mouth every 6 hours as needed for mild pain 30 tablet 0       No Known Allergies    NAVI KIM, ATC

## 2022-06-28 NOTE — PROGRESS NOTES
Cast/splint application    Date/Time: 6/28/2022 10:00 AM  Performed by: Humaira Cline ATC  Authorized by: Ben Pereyra MD     Consent:     Consent obtained:  Verbal    Consent given by:  Patient    Risks discussed:  Discoloration, numbness, pain and swelling    Alternatives discussed:  No treatment  Pre-procedure details:     Sensation:  Normal  Procedure details:     Laterality:  Right    Location:  Hand    Hand:  R hand    Cast type: Short arm, ulnar gutter 3-5, PIP free     Supplies:  Fiberglass  Post-procedure details:     Pain:  Improved    Pain level:  0/10    Sensation:  Normal    Patient tolerance of procedure:  Tolerated well, no immediate complications    Patient provided with cast or splint care instructions: Yes    Comments:      Humaira Cline ATC on 6/28/2022 at 10:01 AM

## 2022-06-28 NOTE — LETTER
6/28/2022         RE: Damon Forman  [address]  [place] [address]        Dear Colleague,    Thank you for referring your patient, Damon Forman, to the [facility]. Please see a copy of my visit note below.    Cast/splint application    Date/Time: 6/28/2022 10:00 AM  Performed by: Humaira Cline ATC  Authorized by: Ben Pereyra MD     Consent:     Consent obtained:  Verbal    Consent given by:  Patient    Risks discussed:  Discoloration, numbness, pain and swelling    Alternatives discussed:  No treatment  Pre-procedure details:     Sensation:  Normal  Procedure details:     Laterality:  Right    Location:  Hand    Hand:  R hand    Cast type: Short arm, ulnar gutter 3-5, PIP free     Supplies:  Fiberglass  Post-procedure details:     Pain:  Improved    Pain level:  0/10    Sensation:  Normal    Patient tolerance of procedure:  Tolerated well, no immediate complications    Patient provided with cast or splint care instructions: Yes    Comments:      Humaira Cline ATC on 6/28/2022 at 10:01 AM            Ortho Hand    HPI: 41M RHD NS p/w R hand injury. This was a fall on a closed R fist in the bathroom 1 week ago. He was seen in the ER and splinted. He presents today for follow-up. No other injuries. Patient states that he has been using R hand for work despite the injury.     ROS: Negative, see HPI  PMH: Nondiabetic  PSH: No surgeries to the hands or wrists  Medications: No blood thinners  Allergies: None  SH: Nonsmoker, denies any tobacco use  FH: No bleeding or clotting issues or problems with anesthesia    Examination:  Nonlabored breathing  Not distressed  R splint removed  Some expectant R dorsal hand swelling and mild tenderness over the R 5th MC neck fracture  No R SF deviation or malrotation deformity compared with the contralateral hand  No R SF extensor lag    XR: ~45-50 deg dorsal apex angulated R 5th MC neck fracture    A/P: 41M RHD NS p/w  R closed Boxer's fracture    -Discussed options for management, including continued immobilization or surgery. Since the patient has acceptable dorsal apex angulation, no clinical deviation or malrotation deformity, this fracture can be managed non-operatively. Patient would like to avoid surgery.   -Placed into R ulnar gutter short-arm cast with PIP joints free today  -Clinic in 1 week for XR in the cast  -If the XR appears unchanged in 1 week, will continue casting for additional 3-4 weeks with return visit for cast removal, re-evaluation and OT Orthoplast splint fabrication  -Patient is agreeable with the plan  -A total of 30 minutes was devoted to review of chart, direct face-to-face patient counseling and documentation during this encounter    Ben Pereyra MD, PhD      Again, thank you for allowing me to participate in the care of your patient.        Sincerely,        Ben Pereyra MD

## 2022-06-28 NOTE — LETTER
Date:June 29, 2022      Provider requested that no letter be sent. Do not send.       [facility]

## 2022-06-29 NOTE — PROGRESS NOTES
Ortho Hand    HPI: 41M RHD NS p/w R hand injury. This was a fall on a closed R fist in the bathroom 1 week ago. He was seen in the ER and splinted. He presents today for follow-up. No other injuries. Patient states that he has been using R hand for work despite the injury.     ROS: Negative, see HPI  PMH: Nondiabetic  PSH: No surgeries to the hands or wrists  Medications: No blood thinners  Allergies: None  SH: Nonsmoker, denies any tobacco use  FH: No bleeding or clotting issues or problems with anesthesia    Examination:  Nonlabored breathing  Not distressed  R splint removed  Some expectant R dorsal hand swelling and mild tenderness over the R 5th MC neck fracture  No R SF deviation or malrotation deformity compared with the contralateral hand  No R SF extensor lag    XR: ~45-50 deg dorsal apex angulated R 5th MC neck fracture    A/P: 41M RHD NS p/w R closed Boxer's fracture    -Discussed options for management, including continued immobilization or surgery. Since the patient has acceptable dorsal apex angulation, no clinical deviation or malrotation deformity, this fracture can be managed non-operatively. Patient would like to avoid surgery.   -Placed into R ulnar gutter short-arm cast with PIP joints free today  -Clinic in 1 week for XR in the cast  -If the XR appears unchanged in 1 week, will continue casting for additional 3-4 weeks with return visit for cast removal, re-evaluation and OT Orthoplast splint fabrication  -Patient is agreeable with the plan  -A total of 30 minutes was devoted to review of chart, direct face-to-face patient counseling and documentation during this encounter    Ben Pereyra MD, PhD

## 2022-07-05 DIAGNOSIS — S62.91XA HAND FRACTURE, RIGHT: Primary | ICD-10-CM

## 2022-07-06 ENCOUNTER — TELEPHONE (OUTPATIENT)
Dept: ORTHOPEDICS | Facility: CLINIC | Age: 42
End: 2022-07-06

## 2022-07-06 ENCOUNTER — OFFICE VISIT (OUTPATIENT)
Dept: ORTHOPEDICS | Facility: CLINIC | Age: 42
End: 2022-07-06
Payer: COMMERCIAL

## 2022-07-06 ENCOUNTER — ANCILLARY PROCEDURE (OUTPATIENT)
Dept: GENERAL RADIOLOGY | Facility: CLINIC | Age: 42
End: 2022-07-06
Attending: STUDENT IN AN ORGANIZED HEALTH CARE EDUCATION/TRAINING PROGRAM
Payer: COMMERCIAL

## 2022-07-06 DIAGNOSIS — S62.339A CLOSED BOXER'S FRACTURE, INITIAL ENCOUNTER: ICD-10-CM

## 2022-07-06 DIAGNOSIS — S62.339A CLOSED BOXER'S FRACTURE, INITIAL ENCOUNTER: Primary | ICD-10-CM

## 2022-07-06 PROCEDURE — 29075 APPL CST ELBW FNGR SHORT ARM: CPT | Mod: 58

## 2022-07-06 PROCEDURE — 73130 X-RAY EXAM OF HAND: CPT | Mod: RT | Performed by: RADIOLOGY

## 2022-07-06 NOTE — TELEPHONE ENCOUNTER
Called number stated in the other telephone encounter dated today and listed on his chart.  Spoke with a woman who claims to be his girlfriend.  I explained that we would be able to do the cast change today anytime before 4 PM and they will call back to state a specific time since Damon was not with her.

## 2022-07-06 NOTE — TELEPHONE ENCOUNTER
[facility] Call Center    Phone Message    May a detailed message be left on voicemail: yes     Reason for Call: Appointment Intake    Referring Provider Name: Dr. Ben Pereyra   Diagnosis and/or Symptoms: WET CAST     Pts girlfriend called to advise cast is wet and will need to be changed.  Pt can be reached at [phone]        Action Taken: Message routed to:  [facility] ([facility]): Orthopedics - Dr. ben Pereyra     Travel Screening: Not Applicable

## 2022-07-06 NOTE — TELEPHONE ENCOUNTER
Just following up with documentation. Triage staff reached out to Pt's girlfriend and he was able to come in today for a cast change.    Diego Wiseman RN

## 2022-07-07 NOTE — PROGRESS NOTES
Cast removal:    Relevant Diagnosis: Right hand boxer's fracture    Patient educated on cast removal process: Yes     A right short arm cast, up to the MCP joints 3-5, was removed per physician instruction.    Skin was observed and found to be intact with no signs of concern:Yes     Concern noted: Patient claims he got it wet swimming, cast did not appear wet. Patient also states continued moderate pain. 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 Dr. Castillo was contacted via phone by the ATC regarding cast and patient activity concerns.     Person(s) involved in removal:   Patient     Questions asked: None.     Patient sent to x-ray: Yes. Dr. Pereyra reviewed 3 views of the right hand virtually today. Instructed re application of a new right ulnar gutter cast, to include ring and small finger up to the fingertips. Patient was instructed to avoid further lifting.    Cast/splint application    Date/Time: 7/6/2022 8:11 AM  Performed by: Olga French ATC  Authorized by: Ben Pereyra MD     Consent:     Consent obtained:  Verbal    Consent given by:  Patient  Pre-procedure details:     Sensation:  Normal  Procedure details:     Laterality:  Right    Location:  Hand    Hand:  R hand    Cast type:  Ulnar gutter    Supplies:  Fiberglass  Post-procedure details:     Pain:  Unchanged    Sensation:  Normal    Patient tolerance of procedure:  Tolerated well, no immediate complications    Patient provided with cast or splint care instructions: Yes    Comments:      Patient was instructed to avoid lifting with the right hand and to keep the cast clean and dry. Will follow up with Dr. Pereyra 7/12/22 as planned.

## 2022-07-12 ENCOUNTER — OFFICE VISIT (OUTPATIENT)
Dept: ORTHOPEDICS | Facility: CLINIC | Age: 42
End: 2022-07-12
Payer: COMMERCIAL

## 2022-07-12 DIAGNOSIS — S62.339D CLOSED BOXER'S FRACTURE WITH ROUTINE HEALING, SUBSEQUENT ENCOUNTER: Primary | ICD-10-CM

## 2022-07-12 PROCEDURE — 99213 OFFICE O/P EST LOW 20 MIN: CPT | Performed by: STUDENT IN AN ORGANIZED HEALTH CARE EDUCATION/TRAINING PROGRAM

## 2022-07-12 NOTE — NURSING NOTE
Reason For Visit:   Chief Complaint   Patient presents with     RECHECK     3 wk follow-up 5th MTC Fracture Right hand DOI 6/23/22       Primary MD: No Ref-Primary, Physician  Ref. MD: EST    Age: 41 year old    ?  No      There were no vitals taken for this visit.      Pain Assessment  Patient Currently in Pain: No    Hand Dominance Evaluation  Hand Dominance: Right          QuickDASH Assessment  No flowsheet data found.       Current Outpatient Medications   Medication Sig Dispense Refill     ibuprofen (ADVIL/MOTRIN) 200 MG tablet Take 3 tablets (600 mg) by mouth every 6 hours as needed for mild pain 30 tablet 0       No Known Allergies    EVELINE Carrillo

## 2022-07-12 NOTE — LETTER
Date:July 12, 2022      Provider requested that no letter be sent. Do not send.       [facility]

## 2022-07-12 NOTE — PROGRESS NOTES
Ortho Hand    Had cast replaced on July 6.  No recent injuries.  No concerns.  Returns for repeat XR.    On exam, right ulnar gutter short arm cast with PIP joints free and viable.    XR: Unchanged right fifth metacarpal neck fracture with about 50 degrees of dorsal apex angulation    A/P: 41-year-old RHD NS p/w R closed Boxer's fracture, sustained 3 weeks ago, with unchanged alignment in a cast    -Continue immobilization for additional 3 weeks  -Return to clinic in 3 weeks with cast removal and XR  -A total of 20 minutes was devoted to review of chart, direct face-to-face patient counseling and documentation during this encounter    Ben Pereyra MD, PhD

## 2022-07-12 NOTE — LETTER
7/12/2022         RE: Damon Forman  [address]  [place] [address]        Dear Colleague,    Thank you for referring your patient, Damon Forman, to the [facility] [place]. Please see a copy of my visit note below.    Ortho Hand    Had cast replaced on July 6.  No recent injuries.  No concerns.  Returns for repeat XR.    On exam, right ulnar gutter short arm cast with PIP joints free and viable.    XR: Unchanged right fifth metacarpal neck fracture with about 50 degrees of dorsal apex angulation    A/P: 41-year-old RHD NS p/w R closed Boxer's fracture, sustained 3 weeks ago, with unchanged alignment in a cast    -Continue immobilization for additional 3 weeks  -Return to clinic in 3 weeks with cast removal and XR  -A total of 20 minutes was devoted to review of chart, direct face-to-face patient counseling and documentation during this encounter    Ben Pereyra MD, PhD      Again, thank you for allowing me to participate in the care of your patient.        Sincerely,        Ben Pereyra MD

## 2022-07-28 DIAGNOSIS — S62.339D CLOSED BOXER'S FRACTURE WITH ROUTINE HEALING, SUBSEQUENT ENCOUNTER: Primary | ICD-10-CM

## 2022-08-02 ENCOUNTER — OFFICE VISIT (OUTPATIENT)
Dept: ORTHOPEDICS | Facility: CLINIC | Age: 42
End: 2022-08-02
Payer: COMMERCIAL

## 2022-08-02 DIAGNOSIS — S62.339D CLOSED BOXER'S FRACTURE WITH ROUTINE HEALING, SUBSEQUENT ENCOUNTER: Primary | ICD-10-CM

## 2022-08-02 PROCEDURE — 99207 PR NO BILLABLE SERVICE THIS VISIT: CPT | Performed by: STUDENT IN AN ORGANIZED HEALTH CARE EDUCATION/TRAINING PROGRAM

## 2022-08-02 NOTE — NURSING NOTE
Reason For Visit:   Chief Complaint   Patient presents with     RECHECK     Right hand boxer fracture follow up.  Still painful in 5th finger       Primary MD: No Ref-Primary, Physician  Ref. MD: Sintia    Age: 41 year old    ?  No      There were no vitals taken for this visit.      Pain Assessment  Patient Currently in Pain: Yes  0-10 Pain Scale: 5  Primary Pain Location: Finger (Comment which one) (right small finger)  Pain Descriptors: Aching (lacking flexion)    Hand Dominance Evaluation  Hand Dominance: Right          QuickDASH Assessment  No flowsheet data found.       Current Outpatient Medications   Medication Sig Dispense Refill     ibuprofen (ADVIL/MOTRIN) 200 MG tablet Take 3 tablets (600 mg) by mouth every 6 hours as needed for mild pain 30 tablet 0       No Known Allergies    NAVI KIM, ATC

## 2022-08-02 NOTE — LETTER
8/2/2022         RE: Damon Forman  [address]  [place] [address]        Dear Colleague,    Thank you for referring your patient, Damon Forman, to the [facility] [place]. Please see a copy of my visit note below.    Ortho Hand    Patient presented to clinic 20 minutes late, had cast taken off and underwent XR, but then left before being seen by me.    On XR, stable alignment of right fifth metacarpal fracture with bone healing.    Our team will reach out to the patient by phone to inquire about additional follow-up.    Ben Pereyra MD, PhD      Again, thank you for allowing me to participate in the care of your patient.        Sincerely,        Ben Pereyra MD

## 2022-08-02 NOTE — LETTER
Date:August 2, 2022      Patient was self referred, no letter generated. Do not send.        [facility] Health Information

## 2022-08-02 NOTE — PROGRESS NOTES
Ortho Hand    Patient presented to clinic 20 minutes late, had cast taken off and underwent XR, but then left before being seen by me.    On XR, stable alignment of right fifth metacarpal fracture with bone healing.    Our team will reach out to the patient by phone to inquire about additional follow-up.    Ben Pereyra MD, PhD

## 2022-08-21 ENCOUNTER — HEALTH MAINTENANCE LETTER (OUTPATIENT)
Age: 42
End: 2022-08-21

## 2022-11-21 ENCOUNTER — HEALTH MAINTENANCE LETTER (OUTPATIENT)
Age: 42
End: 2022-11-21

## 2023-09-16 ENCOUNTER — HEALTH MAINTENANCE LETTER (OUTPATIENT)
Age: 43
End: 2023-09-16

## 2024-11-09 ENCOUNTER — HEALTH MAINTENANCE LETTER (OUTPATIENT)
Age: 44
End: 2024-11-09